# Patient Record
Sex: FEMALE | URBAN - NONMETROPOLITAN AREA
[De-identification: names, ages, dates, MRNs, and addresses within clinical notes are randomized per-mention and may not be internally consistent; named-entity substitution may affect disease eponyms.]

---

## 2022-04-21 ENCOUNTER — APPOINTMENT (RX ONLY)
Dept: URBAN - NONMETROPOLITAN AREA CLINIC 1 | Facility: CLINIC | Age: 32
Setting detail: DERMATOLOGY
End: 2022-04-21

## 2022-04-21 DIAGNOSIS — L91.8 OTHER HYPERTROPHIC DISORDERS OF THE SKIN: ICD-10-CM

## 2022-04-21 PROCEDURE — ? ADDITIONAL NOTES

## 2022-04-21 PROCEDURE — ? FULL BODY SKIN EXAM - DECLINED

## 2022-04-21 PROCEDURE — ? BENIGN DESTRUCTION COSMETIC MULTI

## 2022-04-21 ASSESSMENT — LOCATION SIMPLE DESCRIPTION DERM
LOCATION SIMPLE: RIGHT EYELID
LOCATION SIMPLE: RIGHT CHEEK
LOCATION SIMPLE: LEFT CHEEK

## 2022-04-21 ASSESSMENT — LOCATION DETAILED DESCRIPTION DERM
LOCATION DETAILED: LEFT INFERIOR CENTRAL MALAR CHEEK
LOCATION DETAILED: RIGHT SUPERIOR NASAL CHEEK
LOCATION DETAILED: RIGHT SUPERIOR CENTRAL MALAR CHEEK
LOCATION DETAILED: RIGHT LATERAL CANTHUS
LOCATION DETAILED: RIGHT SUPERIOR MEDIAL MALAR CHEEK

## 2022-04-21 ASSESSMENT — LOCATION ZONE DERM
LOCATION ZONE: EYELID
LOCATION ZONE: FACE

## 2022-04-21 NOTE — PROCEDURE: BENIGN DESTRUCTION COSMETIC MULTI
Consent: The patient's consent was obtained including but not limited to risks of crusting, scabbing, blistering, scarring, darker or lighter pigmentary change, recurrence, incomplete removal and infection.
Detail Level: Zone
Price (Use Numbers Only, No Special Characters Or $): 125.00
Post-Care Instructions: I reviewed with the patient in detail post-care instructions. Patient is to wear sunprotection, and avoid picking at any of the treated lesions. Pt may apply Vaseline to crusted or scabbing areas.
Anesthesia Volume In Cc: 0.5
Total Number Of Lesions Treated: 5

## 2023-01-10 ENCOUNTER — OFFICE VISIT (OUTPATIENT)
Dept: OBGYN CLINIC | Age: 33
End: 2023-01-10

## 2023-01-10 VITALS
WEIGHT: 218 LBS | HEIGHT: 69 IN | BODY MASS INDEX: 32.29 KG/M2 | DIASTOLIC BLOOD PRESSURE: 86 MMHG | SYSTOLIC BLOOD PRESSURE: 134 MMHG

## 2023-01-10 DIAGNOSIS — Z01.419 ENCNTR FOR GYN EXAM (GENERAL) (ROUTINE) W/O ABN FINDINGS: Primary | ICD-10-CM

## 2023-01-10 ASSESSMENT — PATIENT HEALTH QUESTIONNAIRE - PHQ9
SUM OF ALL RESPONSES TO PHQ9 QUESTIONS 1 & 2: 0
1. LITTLE INTEREST OR PLEASURE IN DOING THINGS: 0
SUM OF ALL RESPONSES TO PHQ QUESTIONS 1-9: 0
2. FEELING DOWN, DEPRESSED OR HOPELESS: 0

## 2023-01-10 NOTE — PROGRESS NOTES
Patient here for AE. LAST PAP:  03/02/2022, neg., HPV reflex criteria not met. LAST MAMMO:  never     LMP:  No LMP recorded (lmp unknown).     BIRTH CONTROL:  OCP (estrogen/progesterone)    TOBACCO USE:  No    FAMILY HISTORY OF:   Breast Cancer:  Yes   Ovarian Cancer:  No   Uterine Cancer:  No   Colon Cancer:  Yes    Vitals:    01/10/23 1129   BP: 134/86   Site: Right Upper Arm   Position: Sitting   Weight: 218 lb (98.9 kg)   Height: 5' 9\" (1.753 m)        Christina Ba RN  01/10/23  11:33 AM

## 2023-01-12 RX ORDER — NORETHINDRONE ACETATE AND ETHINYL ESTRADIOL AND FERROUS FUMARATE 1MG-20(24)
1 KIT ORAL DAILY
Qty: 1 PACKET | Refills: 2 | Status: SHIPPED | OUTPATIENT
Start: 2023-01-12

## 2023-01-12 NOTE — PROGRESS NOTES
No problems. OCPs about to run out. Not due for pap yet. Will reschedule annual and send in some RF for her pill.

## 2023-03-15 ENCOUNTER — OFFICE VISIT (OUTPATIENT)
Dept: OBGYN CLINIC | Age: 33
End: 2023-03-15
Payer: COMMERCIAL

## 2023-03-15 VITALS
HEIGHT: 69 IN | SYSTOLIC BLOOD PRESSURE: 122 MMHG | WEIGHT: 210 LBS | DIASTOLIC BLOOD PRESSURE: 78 MMHG | BODY MASS INDEX: 31.1 KG/M2

## 2023-03-15 DIAGNOSIS — Z01.419 ENCNTR FOR GYN EXAM (GENERAL) (ROUTINE) W/O ABN FINDINGS: Primary | ICD-10-CM

## 2023-03-15 DIAGNOSIS — Z30.41 SURVEILLANCE FOR BIRTH CONTROL, ORAL CONTRACEPTIVES: ICD-10-CM

## 2023-03-15 PROCEDURE — 99395 PREV VISIT EST AGE 18-39: CPT | Performed by: OBSTETRICS & GYNECOLOGY

## 2023-03-15 RX ORDER — NORETHINDRONE ACETATE AND ETHINYL ESTRADIOL AND FERROUS FUMARATE 1MG-20(24)
1 KIT ORAL DAILY
Qty: 1 PACKET | Refills: 12 | Status: SHIPPED | OUTPATIENT
Start: 2023-03-15

## 2023-03-15 SDOH — ECONOMIC STABILITY: FOOD INSECURITY: WITHIN THE PAST 12 MONTHS, THE FOOD YOU BOUGHT JUST DIDN'T LAST AND YOU DIDN'T HAVE MONEY TO GET MORE.: PATIENT DECLINED

## 2023-03-15 SDOH — ECONOMIC STABILITY: HOUSING INSECURITY
IN THE LAST 12 MONTHS, WAS THERE A TIME WHEN YOU DID NOT HAVE A STEADY PLACE TO SLEEP OR SLEPT IN A SHELTER (INCLUDING NOW)?: PATIENT REFUSED

## 2023-03-15 SDOH — ECONOMIC STABILITY: INCOME INSECURITY: HOW HARD IS IT FOR YOU TO PAY FOR THE VERY BASICS LIKE FOOD, HOUSING, MEDICAL CARE, AND HEATING?: PATIENT DECLINED

## 2023-03-15 SDOH — ECONOMIC STABILITY: FOOD INSECURITY: WITHIN THE PAST 12 MONTHS, YOU WORRIED THAT YOUR FOOD WOULD RUN OUT BEFORE YOU GOT MONEY TO BUY MORE.: PATIENT DECLINED

## 2023-03-15 ASSESSMENT — PATIENT HEALTH QUESTIONNAIRE - PHQ9
SUM OF ALL RESPONSES TO PHQ QUESTIONS 1-9: 0
SUM OF ALL RESPONSES TO PHQ QUESTIONS 1-9: 0
2. FEELING DOWN, DEPRESSED OR HOPELESS: 0
SUM OF ALL RESPONSES TO PHQ QUESTIONS 1-9: 0
SUM OF ALL RESPONSES TO PHQ QUESTIONS 1-9: 0
1. LITTLE INTEREST OR PLEASURE IN DOING THINGS: 0
SUM OF ALL RESPONSES TO PHQ9 QUESTIONS 1 & 2: 0

## 2023-03-15 NOTE — PROGRESS NOTES
Pt comes in today  for AE. LAST PAP:  03/02/2022 Negative     LAST MAMMO:  Never    LMP:  No LMP recorded. (Menstrual status: Chemically Induced).     BIRTH CONTROL:  OCP (estrogen/progesterone)    TOBACCO USE:  No    FAMILY HISTORY OF:   Breast Cancer:  Yes   Ovarian Cancer:  No   Uterine Cancer:  No   Colon Cancer:  Yes    Vitals:    03/15/23 1000   BP: 122/78   Site: Left Upper Arm   Position: Sitting   Weight: 210 lb (95.3 kg)   Height: 5' 9\" (1.753 m)        Sutton, MA  03/15/23  10:06 AM

## 2023-03-15 NOTE — PROGRESS NOTES
[address], [place] [address], [address] [facility]    Esther Padilla MD, Rainer Leggett MD, GRAZYNA Thrasher-BC    Assessment/Plan     Jayleen was seen today for annual exam.    Diagnoses and all orders for this visit:    Encntr for gyn exam (general) (routine) w/o abn findings  Comments:  - pap normal 3/2022. Deferred this year. No h/o abnormal  - self breast awareness encouraged      Surveillance for birth control, oral contraceptives  Comments:  - takes continuous pills, has amenorrhea  - doing well, RF given  Orders:  -     Norethin Ace-Eth Estrad-FE 1-20 MG-MCG(24) CHEW; Take 1 mg by mouth daily Skip placebo pills and start another pack for continuous active pill use    BMI 31.0-31.9,adult         Subjective     Jayleen Juan Carlos Barbers  28 y.o. Tamara Cardoza presents today for annual Gyn exam.  Takes continuous OCPs, amenorrhea. No breast problem. No problems with intercourse. Denies discharge or bleeding. Normal BM. No urinary issues. They are planning a trip to [facility] and to [place] this summer. LAST PAP: 3/2/22 neg - no h/o abnormal paps. LAST MAMMO: n/a    LMP: No LMP recorded. (Menstrual status: Chemically Induced). BIRTH CONTROL: OCP (estrogen/progesterone)      OB History    Para Term  AB Living   0 0 0 0 0 0   SAB IAB Ectopic Molar Multiple Live Births   0 0 0 0 0 0           History reviewed. No pertinent past medical history.          Past Surgical History:   Procedure Laterality Date    WISDOM TOOTH EXTRACTION             Family History   Problem Relation Age of Onset    Breast Cancer Mother [address]    Diabetes Father     Elevated Lipids Father     Heart Disease Father     Uterine Cancer Neg Hx     Ovarian Cancer Neg Hx     Colon Cancer Maternal Grandmother     Cancer Maternal Grandmother 76    Diabetes Paternal Grandmother     Hypertension Father            Social History     Socioeconomic History    Marital status: Single     Spouse name: Not on file    Number of children: Not on file    Years of education: Not on file    Highest education level: Not on file   Occupational History    Not on file   Tobacco Use    Smoking status: Never    Smokeless tobacco: Never   Substance and Sexual Activity    Alcohol use: Not Currently    Drug use: No    Sexual activity: Yes     Partners: Male     Birth control/protection: Pill   Other Topics Concern    Not on file   Social History Narrative    Abuse: Feels safe at home, no history of physical abuse, no history of sexual abuse      Social Determinants of Health     Financial Resource Strain: Unknown    Difficulty of Paying Living Expenses: Patient refused   Food Insecurity: Unknown    Worried About Running Out of Food in the Last Year: Patient refused    [address] in the Last Year: Patient refused   Transportation Needs: Unknown    Lack of Transportation (Medical):  Not on file    Lack of Transportation (Non-Medical): Patient refused   Physical Activity: Not on file   Stress: Not on file   Social Connections: Not on file   Intimate Partner Violence: Not on file   Housing Stability: Unknown    Unable to Pay for Housing in the Last Year: Not on file    Number of [place] in the Last Year: Not on file    Unstable Housing in the Last Year: Patient refused           Allergies   Allergen Reactions    Cefaclor Other (See Comments)     Childhood allergy           Review of Systems    Constitutional:  No fevers or chills    Neuro:  No headaches, seizure activity    HEENT: no visual changes, bleeding gums    CV: No chest pain or palpitations    Resp: No SOB or cough    Breast:  No masses, pain, D/C, bleeding    GI:  No nausea/vomiting/diarrhea/constipation    : No dysuria or hematuria    MS:  No back, point pain    Gyn:  No menstrual complaints, pelvic pain    Psych:  No depression, anxiety      Objective       Vitals:    03/15/23 1000   BP: 122/78         Physical Exam    General:  well developed, well nourished, in no acute distress     Head:   normocephalic and atraumatic     Mouth:  no deformity or lesions with good dentition     Neck:  no masses, thyromegaly, or abnormal cervical nodes     Chest Wall:  no deformities     Breasts: breast symetrical w/o masses,skin changes,dimpling,or nipple discharge     Lungs:  clear bilaterally with normal respirations     Heart:   regular rate and rhythm, S1, S2 without murmurs     Abdomen:  bowel sounds positive; abdomen soft and non-tender without masses, organomegaly, or hernias noted     Pelvic Exam:       External: normal female genitalia without lesions or masses       Vagina: normal without lesions or masses       Cervix: normal without lesions or masses       Adnexa: normal bimanual exam without masses or fullness       Uterus: normal by palpation       Pap smear: NOT performed     Msk:   no deformity or scoliosis noted with normal posture and gait     Extremities: no clubbing, cyanosis, edema, or deformity noted with normal full range of motion of all joints     Neurologic:  no focal deficits,normal coordination, muscle strength and tone     Skin:   intact without lesions or rashes     Cervical Nodes:  no significant adenopathy     Axillary Nodes:   no significant adenopathy     Psych:  alert and cooperative; normal mood and affect; normal attention span and concentration      Mary Lindquist MD   12:43 PM  03/15/23

## 2024-02-02 DIAGNOSIS — Z30.41 SURVEILLANCE FOR BIRTH CONTROL, ORAL CONTRACEPTIVES: ICD-10-CM

## 2024-02-02 RX ORDER — NORETHINDRONE ACETATE, ETHINYL ESTRADIOL AND FERROUS FUMARATE 1MG-20(24)
KIT ORAL
Qty: 28 TABLET | Refills: 12 | OUTPATIENT
Start: 2024-02-02

## 2024-02-06 DIAGNOSIS — Z30.41 SURVEILLANCE FOR BIRTH CONTROL, ORAL CONTRACEPTIVES: Primary | ICD-10-CM

## 2024-02-06 RX ORDER — NORETHINDRONE ACETATE AND ETHINYL ESTRADIOL AND FERROUS FUMARATE 1MG-20(24)
1 KIT ORAL DAILY
Qty: 1 PACKET | Refills: 1 | Status: SHIPPED | OUTPATIENT
Start: 2024-02-06

## 2024-02-06 NOTE — TELEPHONE ENCOUNTER
Pt lvm stating she needs more refills of BC before her AE in March. Called pt back, informed pt we will send in more refills until her AE. Pt voiced understanding.     Rx pend

## 2024-03-21 ENCOUNTER — TELEPHONE (OUTPATIENT)
Dept: OBGYN CLINIC | Age: 34
End: 2024-03-21

## 2024-03-21 DIAGNOSIS — Z30.41 SURVEILLANCE FOR BIRTH CONTROL, ORAL CONTRACEPTIVES: Primary | ICD-10-CM

## 2024-03-21 RX ORDER — NORETHINDRONE ACETATE AND ETHINYL ESTRADIOL AND FERROUS FUMARATE 1MG-20(24)
1 KIT ORAL DAILY
Qty: 1 PACKET | Refills: 0 | Status: SHIPPED | OUTPATIENT
Start: 2024-03-21

## 2024-04-02 ENCOUNTER — OFFICE VISIT (OUTPATIENT)
Dept: OBGYN CLINIC | Age: 34
End: 2024-04-02
Payer: COMMERCIAL

## 2024-04-02 VITALS
SYSTOLIC BLOOD PRESSURE: 122 MMHG | HEIGHT: 69 IN | BODY MASS INDEX: 28.2 KG/M2 | DIASTOLIC BLOOD PRESSURE: 76 MMHG | WEIGHT: 190.4 LBS

## 2024-04-02 DIAGNOSIS — Z80.3 FAMILY HISTORY OF BREAST CANCER: ICD-10-CM

## 2024-04-02 DIAGNOSIS — Z30.41 SURVEILLANCE FOR BIRTH CONTROL, ORAL CONTRACEPTIVES: ICD-10-CM

## 2024-04-02 DIAGNOSIS — Z12.4 ENCOUNTER FOR PAPANICOLAOU SMEAR FOR CERVICAL CANCER SCREENING: Primary | ICD-10-CM

## 2024-04-02 DIAGNOSIS — Z01.419 ENCNTR FOR GYN EXAM (GENERAL) (ROUTINE) W/O ABN FINDINGS: ICD-10-CM

## 2024-04-02 PROCEDURE — 99459 PELVIC EXAMINATION: CPT | Performed by: OBSTETRICS & GYNECOLOGY

## 2024-04-02 PROCEDURE — 99395 PREV VISIT EST AGE 18-39: CPT | Performed by: OBSTETRICS & GYNECOLOGY

## 2024-04-02 RX ORDER — NORETHINDRONE ACETATE AND ETHINYL ESTRADIOL AND FERROUS FUMARATE 1MG-20(24)
1 KIT ORAL DAILY
Qty: 1 PACKET | Refills: 15 | Status: SHIPPED | OUTPATIENT
Start: 2024-04-02

## 2024-04-02 SDOH — ECONOMIC STABILITY: HOUSING INSECURITY
IN THE LAST 12 MONTHS, WAS THERE A TIME WHEN YOU DID NOT HAVE A STEADY PLACE TO SLEEP OR SLEPT IN A SHELTER (INCLUDING NOW)?: NO

## 2024-04-02 SDOH — ECONOMIC STABILITY: INCOME INSECURITY: HOW HARD IS IT FOR YOU TO PAY FOR THE VERY BASICS LIKE FOOD, HOUSING, MEDICAL CARE, AND HEATING?: NOT HARD AT ALL

## 2024-04-02 SDOH — ECONOMIC STABILITY: FOOD INSECURITY: WITHIN THE PAST 12 MONTHS, YOU WORRIED THAT YOUR FOOD WOULD RUN OUT BEFORE YOU GOT MONEY TO BUY MORE.: NEVER TRUE

## 2024-04-02 SDOH — ECONOMIC STABILITY: FOOD INSECURITY: WITHIN THE PAST 12 MONTHS, THE FOOD YOU BOUGHT JUST DIDN'T LAST AND YOU DIDN'T HAVE MONEY TO GET MORE.: NEVER TRUE

## 2024-04-02 ASSESSMENT — PATIENT HEALTH QUESTIONNAIRE - PHQ9
SUM OF ALL RESPONSES TO PHQ9 QUESTIONS 1 & 2: 0
SUM OF ALL RESPONSES TO PHQ QUESTIONS 1-9: 0
SUM OF ALL RESPONSES TO PHQ QUESTIONS 1-9: 0
2. FEELING DOWN, DEPRESSED OR HOPELESS: NOT AT ALL
SUM OF ALL RESPONSES TO PHQ QUESTIONS 1-9: 0
SUM OF ALL RESPONSES TO PHQ QUESTIONS 1-9: 0
1. LITTLE INTEREST OR PLEASURE IN DOING THINGS: NOT AT ALL

## 2024-04-02 NOTE — PROGRESS NOTES
[facility]  [address], [place] [address]  [phone]    Lou Conner MD, FACOG  Pepito Watts MD, FACOG  GRAZYNA Croft-BC    Assessment/Plan     Jayleen was seen today for annual exam.    Diagnoses and all orders for this visit:    Encounter for Papanicolaou smear for cervical cancer screening  -     PAP IG, Aptima HPV and rfx 16/18,45 (); Future  -     PAP IG, Aptima HPV and rfx 16/18,45 ()    Encntr for gyn exam (general) (routine) w/o abn findings  Comments:  - pap done  - self breast awareness encouraged  - mammogram offered for baseline, yearly at 39  - PCP Efe HDEZ, MARLENE    Surveillance for birth control, oral contraceptives  Comments:  - takes continuous pills, has amenorrhea  - doing well, RF given (gets q 1 month)  Orders:  -     Norethin Ace-Eth Estrad-FE 1-20 MG-MCG(24) CHEW; Take 1 mg by mouth daily Skip placebo pills and start another pack for continuous active pill use    Family history of breast cancer  Comments:  - mother dx in 50-60s  - she will get more info  - offered baseline mammogram, she declines for now  - self checks discussed           Subjective     Jayleendanii Louise  34 y.o.  presents today for annual Gyn exam.  Takes continuous OCPs, amenorrhea. Has to get them filled monthly. No abnormal discharge, no problems with her breasts, no prob with sex. Normal BM, no urinary issues.     Mother breast cancer in 50s-60s, lumpectomy, she doesn't think she needed xRT or chemo. BRCA neg.     ROUTINE HEALTH MAINTENANCE:   Last pap: 3/2/22 neg   - history of abnormal paps: no   - history of cervical procedures: no  Last mammogram: n/a  Last colonoscopy:  n/a  Last DEXA: n/a  Gardasil vaccine: yes    GYN HISTORY:   LMP: No LMP recorded. (Menstrual status: Chemically Induced).  Menarche:[age]  Amenorrhea with OCPs  Menopause: no  Taking hormone replacement therapy: no    SEXUAL HISTORY:   Sexually active: yes  Current contraception: OCP

## 2024-04-02 NOTE — PROGRESS NOTES
Patient here for AE and BC refill.     LAST PAP:  3/2/2022, neg.,     LAST MAMMO:  never     LMP:  No LMP recorded. (Menstrual status: Chemically Induced).    BIRTH CONTROL:  OCP (estrogen/progesterone)    TOBACCO USE:  No    FAMILY HISTORY OF:   Breast Cancer:  Yes   Ovarian Cancer:  No   Uterine Cancer:  No   Colon Cancer:  Yes    Vitals:    04/02/24 1024   BP: 122/76   Site: Right Upper Arm   Position: Sitting   Weight: 86.4 kg (190 lb 6.4 oz)   Height: 1.753 m (5' 9\")        BHARAT IZAGUIRRE RN  04/02/24  10:29 AM

## 2024-04-05 LAB
COLLECTION METHOD: NORMAL
CYTOLOGIST CVX/VAG CYTO: NORMAL
CYTOLOGY CVX/VAG DOC THIN PREP: NORMAL
HPV APTIMA: NEGATIVE
HPV GENOTYPE REFLEX: NORMAL
Lab: NORMAL
PAP SOURCE: NORMAL
PATH REPORT.FINAL DX SPEC: NORMAL
STAT OF ADQ CVX/VAG CYTO-IMP: NORMAL

## 2024-08-02 ENCOUNTER — TELEPHONE (OUTPATIENT)
Dept: OBGYN CLINIC | Age: 34
End: 2024-08-02

## 2024-08-02 NOTE — TELEPHONE ENCOUNTER
Patient called wanting to update her chart with new insurance that she just got changed yesterday 8/1/24.

## 2025-04-08 ENCOUNTER — TELEPHONE (OUTPATIENT)
Dept: OBGYN CLINIC | Age: 35
End: 2025-04-08

## 2025-04-08 NOTE — TELEPHONE ENCOUNTER
Patient LM stating she transferred to a new Ob-Gyn. She stated she sees them tomorrow. Patient is wanting to know how to get her records transferred.     Patient was informed that she would either sign a MAU at our office or the new office. Patient states she will sign it tomorrow at the new office. Patient was given the fax number. Patient voiced understanding. [email or web address]